# Patient Record
Sex: FEMALE | Race: WHITE | Employment: UNEMPLOYED | URBAN - METROPOLITAN AREA
[De-identification: names, ages, dates, MRNs, and addresses within clinical notes are randomized per-mention and may not be internally consistent; named-entity substitution may affect disease eponyms.]

---

## 2019-09-12 ENCOUNTER — HOSPITAL ENCOUNTER (EMERGENCY)
Age: 39
Discharge: HOME | End: 2019-09-12
Attending: EMERGENCY MEDICINE
Payer: COMMERCIAL

## 2019-09-12 VITALS
BODY MASS INDEX: 21.62 KG/M2 | DIASTOLIC BLOOD PRESSURE: 82 MMHG | HEART RATE: 101 BPM | HEIGHT: 63 IN | TEMPERATURE: 98.2 F | WEIGHT: 122 LBS | RESPIRATION RATE: 14 BRPM | SYSTOLIC BLOOD PRESSURE: 130 MMHG | OXYGEN SATURATION: 100 %

## 2019-09-12 DIAGNOSIS — N39.0 ACUTE UTI: Primary | ICD-10-CM

## 2019-09-12 LAB
A/G RATIO: 1.3 (ref 1.1–2.2)
ALBUMIN SERPL-MCNC: 4.2 G/DL (ref 3.4–5)
ALP BLD-CCNC: 71 U/L (ref 40–129)
ALT SERPL-CCNC: 14 U/L (ref 10–40)
ANION GAP SERPL CALCULATED.3IONS-SCNC: 10 MMOL/L (ref 3–16)
AST SERPL-CCNC: 20 U/L (ref 15–37)
BACTERIA: ABNORMAL /HPF
BASOPHILS ABSOLUTE: 0 K/UL (ref 0–0.2)
BASOPHILS RELATIVE PERCENT: 0.4 %
BILIRUB SERPL-MCNC: 0.7 MG/DL (ref 0–1)
BILIRUBIN URINE: NEGATIVE
BLOOD, URINE: NEGATIVE
BUN BLDV-MCNC: 13 MG/DL (ref 7–20)
CALCIUM SERPL-MCNC: 9.1 MG/DL (ref 8.3–10.6)
CHLORIDE BLD-SCNC: 100 MMOL/L (ref 99–110)
CLARITY: CLEAR
CO2: 27 MMOL/L (ref 21–32)
COLOR: YELLOW
CREAT SERPL-MCNC: 0.7 MG/DL (ref 0.6–1.1)
EOSINOPHILS ABSOLUTE: 0.1 K/UL (ref 0–0.6)
EOSINOPHILS RELATIVE PERCENT: 0.7 %
EPITHELIAL CELLS, UA: ABNORMAL /HPF
GFR AFRICAN AMERICAN: >60
GFR NON-AFRICAN AMERICAN: >60
GLOBULIN: 3.2 G/DL
GLUCOSE BLD-MCNC: 99 MG/DL (ref 70–99)
GLUCOSE URINE: NEGATIVE MG/DL
HCG(URINE) PREGNANCY TEST: NEGATIVE
HCT VFR BLD CALC: 39.1 % (ref 36–48)
HEMOGLOBIN: 13.7 G/DL (ref 12–16)
KETONES, URINE: NEGATIVE MG/DL
LEUKOCYTE ESTERASE, URINE: ABNORMAL
LYMPHOCYTES ABSOLUTE: 0.5 K/UL (ref 1–5.1)
LYMPHOCYTES RELATIVE PERCENT: 6.7 %
MCH RBC QN AUTO: 31 PG (ref 26–34)
MCHC RBC AUTO-ENTMCNC: 34.9 G/DL (ref 31–36)
MCV RBC AUTO: 88.8 FL (ref 80–100)
MICROSCOPIC EXAMINATION: YES
MONOCYTES ABSOLUTE: 0.2 K/UL (ref 0–1.3)
MONOCYTES RELATIVE PERCENT: 2.3 %
MUCUS: ABNORMAL /LPF
NEUTROPHILS ABSOLUTE: 7 K/UL (ref 1.7–7.7)
NEUTROPHILS RELATIVE PERCENT: 89.9 %
NITRITE, URINE: NEGATIVE
PDW BLD-RTO: 13.4 % (ref 12.4–15.4)
PH UA: 6 (ref 5–8)
PLATELET # BLD: 248 K/UL (ref 135–450)
PMV BLD AUTO: 7.3 FL (ref 5–10.5)
POTASSIUM REFLEX MAGNESIUM: 4 MMOL/L (ref 3.5–5.1)
PROTEIN UA: ABNORMAL MG/DL
RBC # BLD: 4.4 M/UL (ref 4–5.2)
RBC UA: ABNORMAL /HPF (ref 0–2)
SODIUM BLD-SCNC: 137 MMOL/L (ref 136–145)
SPECIFIC GRAVITY UA: >=1.03 (ref 1–1.03)
TOTAL PROTEIN: 7.4 G/DL (ref 6.4–8.2)
URINE REFLEX TO CULTURE: YES
URINE TYPE: ABNORMAL
UROBILINOGEN, URINE: 0.2 E.U./DL
WBC # BLD: 7.8 K/UL (ref 4–11)
WBC UA: ABNORMAL /HPF (ref 0–5)

## 2019-09-12 PROCEDURE — 85025 COMPLETE CBC W/AUTO DIFF WBC: CPT

## 2019-09-12 PROCEDURE — 80053 COMPREHEN METABOLIC PANEL: CPT

## 2019-09-12 PROCEDURE — 84703 CHORIONIC GONADOTROPIN ASSAY: CPT

## 2019-09-12 PROCEDURE — 87086 URINE CULTURE/COLONY COUNT: CPT

## 2019-09-12 PROCEDURE — 81001 URINALYSIS AUTO W/SCOPE: CPT

## 2019-09-12 PROCEDURE — 99284 EMERGENCY DEPT VISIT MOD MDM: CPT

## 2019-09-12 PROCEDURE — 6370000000 HC RX 637 (ALT 250 FOR IP): Performed by: EMERGENCY MEDICINE

## 2019-09-12 RX ORDER — NITROFURANTOIN 25; 75 MG/1; MG/1
100 CAPSULE ORAL 2 TIMES DAILY
Qty: 14 CAPSULE | Refills: 0 | Status: SHIPPED | OUTPATIENT
Start: 2019-09-12 | End: 2019-09-19

## 2019-09-12 RX ORDER — NITROFURANTOIN 25; 75 MG/1; MG/1
100 CAPSULE ORAL ONCE
Status: COMPLETED | OUTPATIENT
Start: 2019-09-12 | End: 2019-09-12

## 2019-09-12 RX ADMIN — NITROFURANTOIN (MONOHYDRATE/MACROCRYSTALS) 100 MG: 75; 25 CAPSULE ORAL at 03:10

## 2019-09-12 ASSESSMENT — PAIN DESCRIPTION - LOCATION: LOCATION: HEAD

## 2019-09-12 ASSESSMENT — PAIN SCALES - GENERAL: PAINLEVEL_OUTOF10: 7

## 2019-09-13 LAB — URINE CULTURE, ROUTINE: NORMAL

## 2019-09-19 NOTE — ED PROVIDER NOTES
2-12 intact, No gross facial drooping. Strength 5/5, sensation intact. Normal coordination. Gait normal.   PSYCHIATRIC: Normal mood and affect. LABS  I have reviewed all labs for this visit. Results for orders placed or performed during the hospital encounter of 09/12/19   Urine Culture   Result Value Ref Range    Urine Culture, Routine       <10,000 CFU/ml mixed skin/urogenital gale.  No further workup   Urinalysis Reflex to Culture   Result Value Ref Range    Color, UA Yellow Straw/Yellow    Clarity, UA Clear Clear    Glucose, Ur Negative Negative mg/dL    Bilirubin Urine Negative Negative    Ketones, Urine Negative Negative mg/dL    Specific Gravity, UA >=1.030 1.005 - 1.030    Blood, Urine Negative Negative    pH, UA 6.0 5.0 - 8.0    Protein, UA TRACE (A) Negative mg/dL    Urobilinogen, Urine 0.2 <2.0 E.U./dL    Nitrite, Urine Negative Negative    Leukocyte Esterase, Urine MODERATE (A) Negative    Microscopic Examination YES     Urine Reflex to Culture Yes     Urine Type Not Specified    CBC Auto Differential   Result Value Ref Range    WBC 7.8 4.0 - 11.0 K/uL    RBC 4.40 4.00 - 5.20 M/uL    Hemoglobin 13.7 12.0 - 16.0 g/dL    Hematocrit 39.1 36.0 - 48.0 %    MCV 88.8 80.0 - 100.0 fL    MCH 31.0 26.0 - 34.0 pg    MCHC 34.9 31.0 - 36.0 g/dL    RDW 13.4 12.4 - 15.4 %    Platelets 042 490 - 869 K/uL    MPV 7.3 5.0 - 10.5 fL    Neutrophils % 89.9 %    Lymphocytes % 6.7 %    Monocytes % 2.3 %    Eosinophils % 0.7 %    Basophils % 0.4 %    Neutrophils Absolute 7.0 1.7 - 7.7 K/uL    Lymphocytes Absolute 0.5 (L) 1.0 - 5.1 K/uL    Monocytes Absolute 0.2 0.0 - 1.3 K/uL    Eosinophils Absolute 0.1 0.0 - 0.6 K/uL    Basophils Absolute 0.0 0.0 - 0.2 K/uL   Comprehensive Metabolic Panel w/ Reflex to MG   Result Value Ref Range    Sodium 137 136 - 145 mmol/L    Potassium reflex Magnesium 4.0 3.5 - 5.1 mmol/L    Chloride 100 99 - 110 mmol/L    CO2 27 21 - 32 mmol/L    Anion Gap 10 3 - 16    Glucose 99 70 - 99 mg/dL    BUN please feel free to contact the dictating provider for clarification.      Charley Schwab,   ATTENDING, [address] [place] [address],   09/19/19 0283

## 2022-12-12 ENCOUNTER — HOSPITAL ENCOUNTER (EMERGENCY)
Age: 42
Discharge: HOME | End: 2022-12-13
Payer: COMMERCIAL

## 2022-12-12 DIAGNOSIS — R03.0 ELEVATED BLOOD PRESSURE READING: ICD-10-CM

## 2022-12-12 DIAGNOSIS — N30.00 ACUTE CYSTITIS WITHOUT HEMATURIA: Primary | ICD-10-CM

## 2022-12-12 LAB
BACTERIA WET PREP: NORMAL
BACTERIA: ABNORMAL /HPF
BILIRUBIN URINE: NEGATIVE
BLOOD, URINE: NEGATIVE
CLARITY: ABNORMAL
CLUE CELLS: NORMAL
COLOR: YELLOW
EPITHELIAL CELLS WET PREP: NORMAL
EPITHELIAL CELLS, UA: ABNORMAL /HPF (ref 0–5)
GLUCOSE URINE: NEGATIVE MG/DL
KETONES, URINE: NEGATIVE MG/DL
LEUKOCYTE ESTERASE, URINE: NEGATIVE
MICROSCOPIC EXAMINATION: YES
NITRITE, URINE: POSITIVE
PH UA: 6.5 (ref 5–8)
PROTEIN UA: ABNORMAL MG/DL
RBC UA: ABNORMAL /HPF (ref 0–4)
RBC WET PREP: NORMAL
SOURCE WET PREP: NORMAL
SPECIFIC GRAVITY UA: 1.02 (ref 1–1.03)
TRICHOMONAS PREP: NORMAL
URINE REFLEX TO CULTURE: YES
URINE TYPE: ABNORMAL
UROBILINOGEN, URINE: 0.2 E.U./DL
WBC UA: ABNORMAL /HPF (ref 0–5)
WBC WET PREP: NORMAL
YEAST WET PREP: NORMAL

## 2022-12-12 PROCEDURE — 87086 URINE CULTURE/COLONY COUNT: CPT

## 2022-12-12 PROCEDURE — 81001 URINALYSIS AUTO W/SCOPE: CPT

## 2022-12-12 PROCEDURE — 87210 SMEAR WET MOUNT SALINE/INK: CPT

## 2022-12-12 PROCEDURE — 87491 CHLMYD TRACH DNA AMP PROBE: CPT

## 2022-12-12 PROCEDURE — 87077 CULTURE AEROBIC IDENTIFY: CPT

## 2022-12-12 PROCEDURE — 99284 EMERGENCY DEPT VISIT MOD MDM: CPT

## 2022-12-12 PROCEDURE — 84703 CHORIONIC GONADOTROPIN ASSAY: CPT

## 2022-12-12 PROCEDURE — 87186 SC STD MICRODIL/AGAR DIL: CPT

## 2022-12-12 PROCEDURE — 87591 N.GONORRHOEAE DNA AMP PROB: CPT

## 2022-12-12 RX ORDER — CEFUROXIME AXETIL 250 MG/1
500 TABLET ORAL ONCE
Status: COMPLETED | OUTPATIENT
Start: 2022-12-13 | End: 2022-12-13

## 2022-12-12 RX ORDER — CEFUROXIME AXETIL 250 MG/1
250 TABLET ORAL 2 TIMES DAILY
Qty: 14 TABLET | Refills: 0 | Status: SHIPPED | OUTPATIENT
Start: 2022-12-12 | End: 2022-12-19

## 2022-12-12 ASSESSMENT — PAIN DESCRIPTION - ORIENTATION: ORIENTATION: LOWER

## 2022-12-12 ASSESSMENT — PAIN DESCRIPTION - DESCRIPTORS: DESCRIPTORS: CRAMPING;PRESSURE

## 2022-12-12 ASSESSMENT — PAIN - FUNCTIONAL ASSESSMENT
PAIN_FUNCTIONAL_ASSESSMENT: 0-10
PAIN_FUNCTIONAL_ASSESSMENT: ACTIVITIES ARE NOT PREVENTED

## 2022-12-12 ASSESSMENT — PAIN DESCRIPTION - LOCATION: LOCATION: ABDOMEN

## 2022-12-12 ASSESSMENT — LIFESTYLE VARIABLES
HOW OFTEN DO YOU HAVE A DRINK CONTAINING ALCOHOL: NEVER
HOW MANY STANDARD DRINKS CONTAINING ALCOHOL DO YOU HAVE ON A TYPICAL DAY: PATIENT DOES NOT DRINK

## 2022-12-12 ASSESSMENT — PAIN SCALES - GENERAL: PAINLEVEL_OUTOF10: 4

## 2022-12-12 ASSESSMENT — PAIN DESCRIPTION - FREQUENCY: FREQUENCY: INTERMITTENT

## 2022-12-12 ASSESSMENT — PAIN DESCRIPTION - PAIN TYPE: TYPE: ACUTE PAIN

## 2022-12-13 VITALS
WEIGHT: 140 LBS | SYSTOLIC BLOOD PRESSURE: 145 MMHG | HEART RATE: 75 BPM | BODY MASS INDEX: 24.8 KG/M2 | RESPIRATION RATE: 18 BRPM | TEMPERATURE: 98.8 F | HEIGHT: 63 IN | OXYGEN SATURATION: 100 % | DIASTOLIC BLOOD PRESSURE: 105 MMHG

## 2022-12-13 LAB
C TRACH DNA GENITAL QL NAA+PROBE: NEGATIVE
HCG(URINE) PREGNANCY TEST: NEGATIVE
N. GONORRHOEAE DNA: NEGATIVE

## 2022-12-13 PROCEDURE — 6370000000 HC RX 637 (ALT 250 FOR IP): Performed by: PHYSICIAN ASSISTANT

## 2022-12-13 RX ADMIN — CEFUROXIME AXETIL 500 MG: 250 TABLET ORAL at 00:02

## 2022-12-13 ASSESSMENT — ENCOUNTER SYMPTOMS
COUGH: 0
SHORTNESS OF BREATH: 0
NAUSEA: 1
DIARRHEA: 0
CHEST TIGHTNESS: 0
BACK PAIN: 0
ABDOMINAL PAIN: 1
VOMITING: 0
SORE THROAT: 0

## 2022-12-13 NOTE — ED PROVIDER NOTES
**ADVANCED PRACTICE PROVIDER, I HAVE EVALUATED THIS [facility]  ED  EMERGENCY DEPARTMENT ENCOUNTER      Pt Name: Jalil Holt  JMS:[id number]  [place] 1980  Date of evaluation: 12/12/2022  Provider: AVI Hand  Note Started: 11:13 PM EST 12/13/2022        Chief Complaint:    Chief Complaint   Patient presents with    Abdominal Pain     C/o lower abd pain x3 days. Reports it feels like her bladder is full. Endorses cloudy urine, states she gets frequent UTIs. Pt also states \"I figured I should get checked for STDs while im here. \" Denies known exposure. Denies vaginal discharge/odor. Nursing Notes, Past Medical Hx, Past Surgical Hx, Social Hx, Allergies, and Family Hx were all reviewed and agreed with or any disagreements were addressed in the HPI.    HPI: (Location, Duration, Timing, Severity, Quality, Assoc Sx, Context, Modifying factors)    Chief Complaint of abdominal pain and dysuria. This is a  38 y.o. female who presents via private vehicle complaining of abdominal pain and dysuria. The patient states that she feels like her bladder is full and she has noticed cloudy, smelly urine. She states she is prone to UTIs. No recent antibiotics. She also is requesting to be tested for STDs while she is here. She denies any vaginal discharge or bleeding. She has not had any recent exposure to STD. Currently she rates pain as 4/10. She has had associated nausea without vomiting.     PastMedical/Surgical History:      Diagnosis Date    Addiction to drug [facility])     ADHD (attention deficit hyperactivity disorder)     Anxiety     Depression          Procedure Laterality Date    TYMPANOSTOMY TUBE PLACEMENT         Medications:  Discharge Medication List as of 12/13/2022 12:00 AM        CONTINUE these medications which have NOT CHANGED    Details   ibuprofen (ADVIL;MOTRIN) 800 MG tablet Take 1 tablet by mouth every 8 hours as needed for Pain or Fever, Disp-20 tablet, R-0      naproxen (NAPROSYN) 500 MG tablet Take 1 tablet by mouth 2 times daily (with meals) for 30 doses. , Disp-30 tablet, R-0               Review of Systems:  (2-9 systems needed)  Review of Systems   Constitutional:  Negative for chills and fever. HENT:  Negative for congestion, nosebleeds and sore throat. Eyes:  Negative for visual disturbance. Respiratory:  Negative for cough, chest tightness and shortness of breath. Cardiovascular:  Negative for chest pain and palpitations. Gastrointestinal:  Positive for abdominal pain and nausea. Negative for diarrhea and vomiting. Genitourinary:  Positive for dysuria. Negative for frequency, hematuria and urgency. Musculoskeletal:  Negative for back pain and neck pain. Skin:  Negative for rash. Neurological:  Negative for dizziness, weakness, light-headedness and headaches. \"Positives and Pertinent negatives as per HPI\"    Physical Exam:  Physical Exam  Vitals and nursing note reviewed. Constitutional:       Appearance: Normal appearance. She is not diaphoretic. HENT:      Head: Normocephalic and atraumatic. Nose: Nose normal.      Mouth/Throat:      Mouth: Mucous membranes are moist.   Eyes:      General:         Right eye: No discharge. Left eye: No discharge. Extraocular Movements: Extraocular movements intact. Pupils: Pupils are equal, round, and reactive to light. Pulmonary:      Effort: Pulmonary effort is normal. No respiratory distress. Abdominal:      General: Abdomen is flat. Palpations: Abdomen is soft. Tenderness: There is abdominal tenderness in the suprapubic area. There is no right CVA tenderness, left CVA tenderness, guarding or rebound. Musculoskeletal:         General: Normal range of motion. Cervical back: Normal range of motion and neck supple. Skin:     General: Skin is warm and dry. Coloration: Skin is not pale.    Neurological:      Mental Status: She is alert and oriented to person, place, and time. Psychiatric:         Mood and Affect: Mood normal.         Behavior: Behavior normal.       MEDICAL DECISION MAKING    Vitals:    Vitals:    12/12/22 2218 12/13/22 0000   BP: (!) 162/106 (!) 145/105   Pulse: 94 75   Resp: 18    Temp: 98.8 °F (37.1 °C)    TempSrc: Oral    SpO2: 100% 100%   Weight: 140 lb (63.5 kg)    Height: 5' 3\" (1.6 m)        LABS:  Labs Reviewed   URINALYSIS WITH REFLEX TO CULTURE - Abnormal; Notable for the following components:       Result Value    Clarity, UA CLOUDY (*)     Protein, UA TRACE (*)     Nitrite, Urine POSITIVE (*)     All other components within normal limits   MICROSCOPIC URINALYSIS - Abnormal; Notable for the following components:    WBC, UA 10-20 (*)     Bacteria, UA 4+ (*)     All other components within normal limits   WET PREP, GENITAL   C.TRACHOMATIS N.GONORRHOEAE DNA   CULTURE, URINE        Remainder of labs reviewed and were negative at this time or not returned at the time of this note. RADIOLOGY:   Non-plain film images such as CT, Ultrasound and MRI are read by the radiologist. AVI Patel have directly visualized the radiologic plain film image(s) with the below findings:      Interpretation per the Radiologist below, if available at the time of this note:    No orders to display        No results found. MEDICAL DECISION MAKING / ED COURSE:      Is this patient to be included in the SEP-1 Core Measure due to severe sepsis or septic shock? No   Exclusion criteria - the patient is NOT to be included for SEP-1 Core Measure due to:  2+ SIRS criteria are not met      PROCEDURES:   Procedures    None    Patient was given:  Medications   cefUROXime (CEFTIN) tablet 500 mg (500 mg Oral Given 12/13/22 0002)       Patient was evaluated in the emergency department today for abdominal pain and dysuria.   Vital signs are notable for 145/105, the patient states she does not follow up with a primary care physician and will be referred to primary care physician for further evaluation and treatment. Physical exam is unremarkable. The patient is overall well-appearing. The patient will self swab for STD. Work-up results include:  Urinalysis with cloudy urine and nitrite positive. Wet prep unremarkable. Gonorrhoeae and chlamydia pending. He discussion was had with the patient regarding lab results. She is given initial dose of antibiotic in the emergency department remainder sent over to pharmacy. She will follow-up with primary care referral for further evaluation and treatment. Return precautions are given. All questions are answered and she is discharged home in good condition. The patient tolerated their visit well. I evaluated the patient. The physician was available for consultation as needed. The patient and / or the family were informed of the results of any tests, a time was given to answer questions, a plan was proposed and they agreed with plan. I am the Primary Clinician of Record. CLINICAL IMPRESSION:  1.  Acute cystitis without hematuria      Results for orders placed or performed during the hospital encounter of 12/12/22   Wet prep, genital    Specimen: Vaginal   Result Value Ref Range    Trichomonas Prep None Seen     Yeast, Wet Prep None Seen     Clue Cells, Wet Prep None Seen     WBC, Wet Prep <1+     RBC, Wet Prep <1+     Epi Cells 4+     Bacteria <1+     Source Wet Prep Vaginal    C.trachomatis N.gonorrhoeae DNA    Specimen: Vaginal   Result Value Ref Range    C. trachomatis DNA Negative Negative    N. gonorrhoeae DNA Negative Negative   Culture, Urine    Specimen: Urine, clean catch   Result Value Ref Range    Organism Klebsiella pneumoniae (A)     Urine Culture, Routine >100,000 CFU/ml     Organism Escherichia coli (A)     Urine Culture, Routine >100,000 CFU/ml        Susceptibility    Escherichia coli - BACTERIAL SUSCEPTIBILITY PANEL BY YAMIL     ampicillin <=8 Sensitive mcg/mL ampicillin-sulbactam <=8/4 Sensitive mcg/mL     ceFAZolin* <=2 Sensitive mcg/mL      * NOTE: Cefazolin should only be used for uncomplicated UTI        for E.coli or Klebsiella pneumoniae. NOTE: Cefazolin should only be used for uncomplicated UTI        for E.coli or Klebsiella pneumoniae. cefepime <=2 Sensitive mcg/mL     cefTRIAXone <=1 Sensitive mcg/mL     cefuroxime <=4 Sensitive mcg/mL     ciprofloxacin <=1 Sensitive mcg/mL     ertapenem <=0.5 Sensitive mcg/mL     gentamicin <=4 Sensitive mcg/mL     meropenem <=1 Sensitive mcg/mL     nitrofurantoin <=32 Sensitive mcg/mL     piperacillin-tazobactam <=16 Sensitive mcg/mL     trimethoprim-sulfamethoxazole <=2/38 Sensitive mcg/mL    Klebsiella pneumoniae - BACTERIAL SUSCEPTIBILITY PANEL BY YAMIL     amoxicillin-clavulanate <=8/4 Sensitive mcg/mL     ampicillin >16 Resistant mcg/mL     ampicillin-sulbactam <=8/4 Sensitive mcg/mL     ceFAZolin* <=2 Sensitive mcg/mL      * NOTE: Cefazolin should only be used for uncomplicated UTI        for E.coli or Klebsiella pneumoniae.        cefepime <=2 Sensitive mcg/mL     cefTRIAXone <=1 Sensitive mcg/mL     cefuroxime <=4 Sensitive mcg/mL     ciprofloxacin <=1 Sensitive mcg/mL     ertapenem <=0.5 Sensitive mcg/mL     gentamicin <=4 Sensitive mcg/mL     meropenem <=1 Sensitive mcg/mL     nitrofurantoin 64 Intermediate mcg/mL     piperacillin-tazobactam <=16 Sensitive mcg/mL     trimethoprim-sulfamethoxazole <=2/38 Sensitive mcg/mL   Urinalysis with Reflex to Culture    Specimen: Urine   Result Value Ref Range    Color, UA Yellow Straw/Yellow    Clarity, UA CLOUDY (A) Clear    Glucose, Ur Negative Negative mg/dL    Bilirubin Urine Negative Negative    Ketones, Urine Negative Negative mg/dL    Specific Gravity, UA 1.025 1.005 - 1.030    Blood, Urine Negative Negative    pH, UA 6.5 5.0 - 8.0    Protein, UA TRACE (A) Negative mg/dL    Urobilinogen, Urine 0.2 <2.0 E.U./dL    Nitrite, Urine POSITIVE (A) Negative    Leukocyte Esterase, Urine Negative Negative    Microscopic Examination YES     Urine Type NotGiven     Urine Reflex to Culture Yes    Microscopic Urinalysis   Result Value Ref Range    WBC, UA 10-20 (A) 0 - 5 /HPF    RBC, UA None seen 0 - 4 /HPF    Epithelial Cells, UA 2-5 0 - 5 /HPF    Bacteria, UA 4+ (A) None Seen /HPF   Urine Preg (Lab)   Result Value Ref Range    HCG(Urine) Pregnancy Test Negative Detects HCG level >20 MIU/mL         I estimate there is LOW risk for ACUTE APPENDICITIS, BOWEL OBSTRUCTION, CHOLECYSTITIS, DIVERTICULITIS, INCARCERATED HERNIA, PANCREATITIS, or PERFORATED BOWEL or ULCER, thus I consider the discharge disposition reasonable. Also, there is no evidence or peritonitis, sepsis, or toxicity. Denisa Schneider and I have discussed the diagnosis and risks, and we agree with discharging home to follow-up with their primary doctor. We also discussed returning to the Emergency Department immediately if new or worsening symptoms occur. We have discussed the symptoms which are most concerning (e.g., bloody stool, fever, changing or worsening pain, vomiting) that necessitate immediate return. FINAL Impression    1. Acute cystitis without hematuria    2. Elevated blood pressure reading        Blood pressure (!) 145/105, pulse 75, temperature 98.8 °F (37.1 °C), temperature source Oral, resp. rate 18, height 5' 3\" (1.6 m), weight 140 lb (63.5 kg), SpO2 100 %, not currently breastfeeding.       DISPOSITION Decision To Discharge 12/12/2022 11:46:25 PM      PATIENT REFERRED TO:  [facility]  ED  [address] [facility] [address]  Go to   If symptoms worsen    [address] [facility]  Schedule an appointment as soon as possible for a visit       DISCHARGE MEDICATIONS:  Discharge Medication List as of 12/13/2022 12:00 AM        START taking these medications    Details   cefUROXime (CEFTIN) 250 MG tablet Take 1 tablet by mouth 2 times daily for 7 days, Disp-14 tablet, R-0Normal             DISCONTINUED MEDICATIONS:  Discharge Medication List as of 12/13/2022 12:00 AM                 (Please note the MDM and HPI sections of this note were completed with a voice recognition program.  Efforts were made to edit the dictations but occasionally words are mis-transcribed.)    Electronically signed, Sayda Doyle,          Sayda Doyle  12/17/22 5988

## 2022-12-15 LAB
ORGANISM: ABNORMAL
ORGANISM: ABNORMAL
URINE CULTURE, ROUTINE: ABNORMAL
URINE CULTURE, ROUTINE: ABNORMAL

## 2022-12-22 ENCOUNTER — OFFICE VISIT (OUTPATIENT)
Dept: INTERNAL MEDICINE CLINIC | Age: 42
End: 2022-12-22

## 2022-12-22 ENCOUNTER — HOSPITAL ENCOUNTER (OUTPATIENT)
Age: 42
Discharge: HOME | End: 2022-12-22
Payer: COMMERCIAL

## 2022-12-22 VITALS
HEIGHT: 65 IN | HEART RATE: 88 BPM | WEIGHT: 138 LBS | SYSTOLIC BLOOD PRESSURE: 128 MMHG | BODY MASS INDEX: 22.99 KG/M2 | OXYGEN SATURATION: 99 % | DIASTOLIC BLOOD PRESSURE: 84 MMHG | TEMPERATURE: 97.3 F

## 2022-12-22 DIAGNOSIS — Z87.59 HISTORY OF PRE-ECLAMPSIA: ICD-10-CM

## 2022-12-22 DIAGNOSIS — F32.0 CURRENT MILD EPISODE OF MAJOR DEPRESSIVE DISORDER, UNSPECIFIED WHETHER RECURRENT (HCC): ICD-10-CM

## 2022-12-22 DIAGNOSIS — Z12.4 CERVICAL CANCER SCREENING: ICD-10-CM

## 2022-12-22 DIAGNOSIS — F19.91 HISTORY OF INTRAVENOUS DRUG USE IN REMISSION: ICD-10-CM

## 2022-12-22 DIAGNOSIS — Z76.89 ENCOUNTER TO ESTABLISH CARE: Primary | ICD-10-CM

## 2022-12-22 DIAGNOSIS — Z76.89 ENCOUNTER TO ESTABLISH CARE: ICD-10-CM

## 2022-12-22 DIAGNOSIS — F17.210 SMOKING 1/2 PACK A DAY OR LESS: ICD-10-CM

## 2022-12-22 DIAGNOSIS — N39.0 RECURRENT UTI (URINARY TRACT INFECTION): ICD-10-CM

## 2022-12-22 DIAGNOSIS — F90.9 ATTENTION DEFICIT HYPERACTIVITY DISORDER (ADHD), UNSPECIFIED ADHD TYPE: ICD-10-CM

## 2022-12-22 DIAGNOSIS — F41.1 GAD (GENERALIZED ANXIETY DISORDER): ICD-10-CM

## 2022-12-22 LAB
A/G RATIO: 1.3 (ref 1.1–2.2)
ALBUMIN SERPL-MCNC: 4.2 G/DL (ref 3.4–5)
ALP BLD-CCNC: 71 U/L (ref 40–129)
ALT SERPL-CCNC: 13 U/L (ref 10–40)
ANION GAP SERPL CALCULATED.3IONS-SCNC: 15 MMOL/L (ref 3–16)
AST SERPL-CCNC: 18 U/L (ref 15–37)
BASOPHILS ABSOLUTE: 0.1 K/UL (ref 0–0.2)
BASOPHILS RELATIVE PERCENT: 0.8 %
BILIRUB SERPL-MCNC: 0.6 MG/DL (ref 0–1)
BUN BLDV-MCNC: 15 MG/DL (ref 7–20)
CALCIUM SERPL-MCNC: 9.4 MG/DL (ref 8.3–10.6)
CHLORIDE BLD-SCNC: 98 MMOL/L (ref 99–110)
CHOLESTEROL, TOTAL: 172 MG/DL (ref 0–199)
CO2: 20 MMOL/L (ref 21–32)
CREAT SERPL-MCNC: 0.6 MG/DL (ref 0.6–1.1)
EOSINOPHILS ABSOLUTE: 0.3 K/UL (ref 0–0.6)
EOSINOPHILS RELATIVE PERCENT: 2.7 %
GFR SERPL CREATININE-BSD FRML MDRD: >60 ML/MIN/{1.73_M2}
GLUCOSE BLD-MCNC: 79 MG/DL (ref 70–99)
HCT VFR BLD CALC: 41.8 % (ref 36–48)
HDLC SERPL-MCNC: 56 MG/DL (ref 40–60)
HEMOGLOBIN: 14.2 G/DL (ref 12–16)
LDL CHOLESTEROL CALCULATED: 94 MG/DL
LYMPHOCYTES ABSOLUTE: 2.3 K/UL (ref 1–5.1)
LYMPHOCYTES RELATIVE PERCENT: 22.6 %
MCH RBC QN AUTO: 29.9 PG (ref 26–34)
MCHC RBC AUTO-ENTMCNC: 34 G/DL (ref 31–36)
MCV RBC AUTO: 87.9 FL (ref 80–100)
MONOCYTES ABSOLUTE: 0.5 K/UL (ref 0–1.3)
MONOCYTES RELATIVE PERCENT: 5.2 %
NEUTROPHILS ABSOLUTE: 7.1 K/UL (ref 1.7–7.7)
NEUTROPHILS RELATIVE PERCENT: 68.7 %
PDW BLD-RTO: 13 % (ref 12.4–15.4)
PLATELET # BLD: 264 K/UL (ref 135–450)
PMV BLD AUTO: 9.5 FL (ref 5–10.5)
POTASSIUM SERPL-SCNC: 4 MMOL/L (ref 3.5–5.1)
RBC # BLD: 4.76 M/UL (ref 4–5.2)
SODIUM BLD-SCNC: 133 MMOL/L (ref 136–145)
TOTAL PROTEIN: 7.4 G/DL (ref 6.4–8.2)
TRIGL SERPL-MCNC: 111 MG/DL (ref 0–150)
TSH REFLEX FT4: 1.96 UIU/ML (ref 0.27–4.2)
VLDLC SERPL CALC-MCNC: 22 MG/DL
WBC # BLD: 10.4 K/UL (ref 4–11)

## 2022-12-22 PROCEDURE — 80053 COMPREHEN METABOLIC PANEL: CPT

## 2022-12-22 PROCEDURE — 87390 HIV-1 AG IA: CPT

## 2022-12-22 PROCEDURE — 36415 COLL VENOUS BLD VENIPUNCTURE: CPT

## 2022-12-22 PROCEDURE — 86803 HEPATITIS C AB TEST: CPT

## 2022-12-22 PROCEDURE — 86702 HIV-2 ANTIBODY: CPT

## 2022-12-22 PROCEDURE — 86701 HIV-1ANTIBODY: CPT

## 2022-12-22 PROCEDURE — 85025 COMPLETE CBC W/AUTO DIFF WBC: CPT

## 2022-12-22 PROCEDURE — 84443 ASSAY THYROID STIM HORMONE: CPT

## 2022-12-22 PROCEDURE — 80061 LIPID PANEL: CPT

## 2022-12-22 RX ORDER — BUPROPION HYDROCHLORIDE 150 MG/1
150 TABLET ORAL EVERY MORNING
Qty: 30 TABLET | Refills: 3 | Status: SHIPPED | OUTPATIENT
Start: 2022-12-22

## 2022-12-22 SDOH — ECONOMIC STABILITY: FOOD INSECURITY: WITHIN THE PAST 12 MONTHS, THE FOOD YOU BOUGHT JUST DIDN'T LAST AND YOU DIDN'T HAVE MONEY TO GET MORE.: NEVER TRUE

## 2022-12-22 SDOH — ECONOMIC STABILITY: FOOD INSECURITY: WITHIN THE PAST 12 MONTHS, YOU WORRIED THAT YOUR FOOD WOULD RUN OUT BEFORE YOU GOT MONEY TO BUY MORE.: NEVER TRUE

## 2022-12-22 ASSESSMENT — PATIENT HEALTH QUESTIONNAIRE - PHQ9
4. FEELING TIRED OR HAVING LITTLE ENERGY: 3
SUM OF ALL RESPONSES TO PHQ QUESTIONS 1-9: 17
6. FEELING BAD ABOUT YOURSELF - OR THAT YOU ARE A FAILURE OR HAVE LET YOURSELF OR YOUR FAMILY DOWN: 1
3. TROUBLE FALLING OR STAYING ASLEEP: 1
2. FEELING DOWN, DEPRESSED OR HOPELESS: 2
SUM OF ALL RESPONSES TO PHQ9 QUESTIONS 1 & 2: 5
9. THOUGHTS THAT YOU WOULD BE BETTER OFF DEAD, OR OF HURTING YOURSELF: 0
8. MOVING OR SPEAKING SO SLOWLY THAT OTHER PEOPLE COULD HAVE NOTICED. OR THE OPPOSITE, BEING SO FIGETY OR RESTLESS THAT YOU HAVE BEEN MOVING AROUND A LOT MORE THAN USUAL: 3
7. TROUBLE CONCENTRATING ON THINGS, SUCH AS READING THE NEWSPAPER OR WATCHING TELEVISION: 3
10. IF YOU CHECKED OFF ANY PROBLEMS, HOW DIFFICULT HAVE THESE PROBLEMS MADE IT FOR YOU TO DO YOUR WORK, TAKE CARE OF THINGS AT HOME, OR GET ALONG WITH OTHER PEOPLE: 0
SUM OF ALL RESPONSES TO PHQ QUESTIONS 1-9: 17
5. POOR APPETITE OR OVEREATING: 1
SUM OF ALL RESPONSES TO PHQ QUESTIONS 1-9: 17
1. LITTLE INTEREST OR PLEASURE IN DOING THINGS: 3
SUM OF ALL RESPONSES TO PHQ QUESTIONS 1-9: 17

## 2022-12-22 ASSESSMENT — SOCIAL DETERMINANTS OF HEALTH (SDOH): HOW HARD IS IT FOR YOU TO PAY FOR THE VERY BASICS LIKE FOOD, HOUSING, MEDICAL CARE, AND HEATING?: NOT HARD AT ALL

## 2022-12-22 NOTE — PROGRESS NOTES
[facility] Internal Medicine  Establish care visit   2022    Zion Jain (:  1980) is a 38 y.o. female, here to establish care. Chief Complaint   Patient presents with    Established New Doctor    Urinary Tract Infection    Blood Pressure Check        ASSESSMENT/ PLAN  1. Attention deficit hyperactivity disorder (ADHD), unspecified ADHD type  2. BRITTANY (generalized anxiety disorder)  3. Current mild episode of major depressive disorder, unspecified whether recurrent ([address]  -Has not been on any medications. Denies SI or HI. Will start on Wellbutrin. Follow-up with Sophia . - buPROPion (WELLBUTRIN XL) 150 MG extended release tablet; Take 1 tablet by mouth every morning  Dispense: 30 tablet; Refill: 3    4. History of pre-eclampsia  -Positive history of preeclampsia. Monitor blood pressure closely. Blood pressure at goal today. 5. History of intravenous drug use in remission  -Last drug use 3 years ago. Screen for HIV and hep C  - HIV Screen; Future  - Hepatitis C Antibody; Future    6. Encounter to establish care  - TSH with Reflex to FT4; Future  - Lipid Panel; Future  - Comprehensive Metabolic Panel; Future  - CBC with Auto Differential; Future    7. Recurrent UTI (urinary tract infection)  -Patient noted to have at least 6 episodes of urinary tract infection. Refer to urology. Currently on antibiotics. Denies any fevers or back pain  - JOSSELINE - [facility]    8. Cervical cancer screening  - JOSSELINE Victor MD, Gynecology, [facility]    9. Smoking 1/2 pack a day or less  -Smokes less than half a pack now. Has smoked for over 20 years. Counseled on smoking cessation. Wants to try nicotine patches. Prescription sent    Return in about 3 months (around 3/22/2023) for Anxiety,BRITTANY. HPI  Patient is a 51-year-old female with past medical history significant for ADHD, depression, GERD, drug use presenting to establish care.   He endorses depressed mood but notes she is taking care of herself at this time. Was on Adderall long-term as well in the past.  Denies any SI or HI. Recently seen in the emergency room for acute cystitis and was treated with antibiotics which she is taking. Denies any new symptoms today. She noted to have at least 6 episodes of urinary tract infection this year. She was a past IV drug user and injected heroin. Last use 3 years ago. Continues to smoke one half a pack every day and has been drinking for last 20 years. Denies hemoptysis. ROS:      CONSTITUTIONAL:  No fevers, chills, sweats or weight changes  EYES:  No redness or visual symptoms. EARS, NOSE AND THROAT:  No difficulties with hearing. No symptoms of rhinitis or sore throat. CARDIOVASCULAR:  No chest pains, palpitations, orthopnea or paroxysmal noctunal dyspnea. RESPIRATORY:  No dyspnea on exertion, wheezing or cough. GI:  No nausea, vomiting, diarrhea, constipation, abdominal pain, hematochezia or melena. :  No dysuria, urinary hesitancy or dribbling. No nocturia or urinary frequency. No abnormal urethral  discharge. MUSCULOSKELETAL:  No muscle, joint or back aches  NEUROLOGIC:  No chronic headaches, no seizures. No numbness, tingling or weakness. PSYCHIATRIC: Endorses problems with anxiety and depressed mood. Denies SI or HI  ENDOCRINE:  No excessive urination or excessive thirst.  DERMATOLOGIC:  No rashes or skin changes. HISTORIES  Current Outpatient Medications on File Prior to Visit   Medication Sig Dispense Refill    ibuprofen (ADVIL;MOTRIN) 800 MG tablet Take 1 tablet by mouth every 8 hours as needed for Pain or Fever 20 tablet 0    naproxen (NAPROSYN) 500 MG tablet Take 1 tablet by mouth 2 times daily (with meals) for 30 doses. 30 tablet 0     No current facility-administered medications on file prior to visit.         No Known Allergies    Past Medical History:   Diagnosis Date    Addiction to drug [facility])     ADHD (attention deficit hyperactivity disorder)     Anxiety     Depression        There is no problem list on file for this patient. Past Surgical History:   Procedure Laterality Date    TYMPANOSTOMY TUBE PLACEMENT         Social History     Socioeconomic History    Marital status:      Spouse name: Not on file    Number of children: Not on file    Years of education: Not on file    Highest education level: Not on file   Occupational History    Not on file   Tobacco Use    Smoking status: Every Day     Packs/day: 0.50     Types: Cigarettes    Smokeless tobacco: Never   Substance and Sexual Activity    Alcohol use: No    Drug use: No    Sexual activity: Not on file   Other Topics Concern    Not on file   Social History Narrative    ** Merged History Encounter **          Social Determinants of Health     Financial Resource Strain: Low Risk     Difficulty of Paying Living Expenses: Not hard at all   Food Insecurity: No Food Insecurity    Worried About Running Out of Food in the Last Year: Never true    Ran Out of Food in the Last Year: Never true   Transportation Needs: Not on file   Physical Activity: Not on file   Stress: Not on file   Social Connections: Not on file   Intimate Partner Violence: Not on file   Housing Stability: Not on file        History reviewed. No pertinent family history. Physical Exam:    GENERAL APPEARANCE:  The patient is pleasant and well-appearing, in no acute distress A&Ox3, normal habitus  HEENT:  Normocephalic and atraumatic. No scleral Icterus. Pupils are equal and round and reactive to light and accomodation. No conjunctival injection is noted. Oropharynx is clear. Mouth revealed is good dentition, no lesions. Tympanic membranes are intact. No lymphadenopathy. LUNGS:  Equal air entry and clear of auscultation bilaterally. There are not crackles, wheezes or rhonchi noted. HEART:  Regular rate and rhythm, S1/S2. No murmurs are noted.   There are no lifts, heaves, or thrills noted on palpation. ABDOMEN:  Soft, non tender, non distended and no organomegaly. There are good bowel sounds. There is no rebounding or guarding. There is no evidence of hernia. SKIN:  There are no rashes, lesions, or ulcers noted. Warm and dry with good turgor. MUSCULOSKELETAL:  Gait is coordinated and smooth. There is no clubbing, cyanosis or edema. B/I pedal pulses are palpable. NEUROLOGIC:  Cranial nerves II through XII are grossly intact. Sensation to light touch and pain is intact and bilaterally. Vitals:    12/22/22 1325   BP: 128/84   Site: Right Upper Arm   Position: Sitting   Cuff Size: Medium Adult   Pulse: 88   Temp: 97.3 °F (36.3 °C)   TempSrc: Temporal   SpO2: 99%   Weight: 138 lb (62.6 kg)   Height: 5' 5\" (1.651 m)     Estimated body mass index is 22.96 kg/m² as calculated from the following:    Height as of this encounter: 5' 5\" (1.651 m). Weight as of this encounter: 138 lb (62.6 kg). Immunization History   Administered Date(s) Administered    Influenza Virus Vaccine 09/22/2020, 09/22/2020    MMR 05/02/1984    Tdap (Boostrix, Adacel) 11/07/2011, 12/08/2020       Health Maintenance   Topic Date Due    COVID-19 Vaccine (1) Never done    Varicella vaccine (1 of 2 - 2-dose childhood series) Never done    Pneumococcal 0-64 years Vaccine (1 - PCV) Never done    HIV screen  Never done    Cervical cancer screen  Never done    Flu vaccine (1) 08/01/2022    Depression Monitoring  12/22/2023    Lipids  12/22/2027    DTaP/Tdap/Td vaccine (3 - Td or Tdap) 12/08/2030    Hepatitis C screen  Completed    Hepatitis A vaccine  Aged Out    Hib vaccine  Aged Out    Meningococcal (ACWY) vaccine  Aged Out       PSH, PMH, SH and FH reviewed and noted. Recent and past labs, tests and consults also reviewed. Recent or new meds also reviewed. Sav Cardenas MD    This dictation was generated by voice recognition computer software.   Although all attempts are made to edit the dictation for accuracy, there may be errors in the transcription that are not intended.

## 2022-12-23 LAB
HEPATITIS C ANTIBODY INTERPRETATION: REACTIVE
HIV AG/AB: NORMAL
HIV ANTIGEN: NORMAL
HIV-1 ANTIBODY: NORMAL
HIV-2 AB: NORMAL

## 2022-12-23 RX ORDER — NICOTINE 21 MG/24HR
1 PATCH, TRANSDERMAL 24 HOURS TRANSDERMAL DAILY
Qty: 42 PATCH | Refills: 3 | Status: SHIPPED | OUTPATIENT
Start: 2022-12-23 | End: 2023-06-09

## 2022-12-28 DIAGNOSIS — R76.8 POSITIVE HEPATITIS C ANTIBODY TEST: Primary | ICD-10-CM

## 2022-12-30 ENCOUNTER — TELEPHONE (OUTPATIENT)
Dept: INTERNAL MEDICINE CLINIC | Age: 42
End: 2022-12-30

## 2022-12-30 DIAGNOSIS — R30.0 DYSURIA: Primary | ICD-10-CM

## 2022-12-30 RX ORDER — SULFAMETHOXAZOLE AND TRIMETHOPRIM 800; 160 MG/1; MG/1
1 TABLET ORAL 2 TIMES DAILY
Qty: 14 TABLET | Refills: 0 | Status: SHIPPED | OUTPATIENT
Start: 2022-12-30 | End: 2023-01-06

## 2022-12-30 NOTE — TELEPHONE ENCOUNTER
[employer] Page:  Patient relates seen in the emergency room and treated for UTI. Review of chart 12/12/2022: Urine culture Klebsiella. Moderate treated with cefuroxime. She states recurrence of symptoms in the last few days: Burning with urination. Frequency of urination. Upon review of urine culture both E. coli and Klebsiella sensitive to Cipro and TMP sulfa. Patient relates no allergies to antibiotics. Impression: UTI  Plan: TMP sulfa emailed to [facility].   Patient also states she was told to see urologist  Dr. aSlas Roles

## 2023-01-19 ENCOUNTER — HOSPITAL ENCOUNTER (EMERGENCY)
Age: 43
Discharge: HOME | End: 2023-01-19
Attending: EMERGENCY MEDICINE
Payer: COMMERCIAL

## 2023-01-19 ENCOUNTER — APPOINTMENT (OUTPATIENT)
Dept: ULTRASOUND IMAGING | Age: 43
End: 2023-01-19
Payer: COMMERCIAL

## 2023-01-19 VITALS
WEIGHT: 145 LBS | DIASTOLIC BLOOD PRESSURE: 94 MMHG | SYSTOLIC BLOOD PRESSURE: 139 MMHG | HEART RATE: 91 BPM | BODY MASS INDEX: 25.69 KG/M2 | OXYGEN SATURATION: 100 % | TEMPERATURE: 98.2 F | HEIGHT: 63 IN | RESPIRATION RATE: 16 BRPM

## 2023-01-19 DIAGNOSIS — O20.0 THREATENED MISCARRIAGE: Primary | ICD-10-CM

## 2023-01-19 DIAGNOSIS — R82.71 BACTERIURIA IN PREGNANCY: ICD-10-CM

## 2023-01-19 DIAGNOSIS — O99.891 BACTERIURIA IN PREGNANCY: ICD-10-CM

## 2023-01-19 LAB
A/G RATIO: 1.2 (ref 1.1–2.2)
ALBUMIN SERPL-MCNC: 3.9 G/DL (ref 3.4–5)
ALP BLD-CCNC: 76 U/L (ref 40–129)
ALT SERPL-CCNC: 16 U/L (ref 10–40)
ANION GAP SERPL CALCULATED.3IONS-SCNC: 10 MMOL/L (ref 3–16)
AST SERPL-CCNC: 23 U/L (ref 15–37)
BASOPHILS ABSOLUTE: 0.1 K/UL (ref 0–0.2)
BASOPHILS RELATIVE PERCENT: 0.9 %
BILIRUB SERPL-MCNC: <0.2 MG/DL (ref 0–1)
BILIRUBIN URINE: NEGATIVE
BLOOD, URINE: ABNORMAL
BUN BLDV-MCNC: 12 MG/DL (ref 7–20)
CALCIUM SERPL-MCNC: 9.4 MG/DL (ref 8.3–10.6)
CHLORIDE BLD-SCNC: 103 MMOL/L (ref 99–110)
CLARITY: ABNORMAL
CO2: 26 MMOL/L (ref 21–32)
COLOR: YELLOW
CREAT SERPL-MCNC: 0.6 MG/DL (ref 0.6–1.1)
EOSINOPHILS ABSOLUTE: 0.2 K/UL (ref 0–0.6)
EOSINOPHILS RELATIVE PERCENT: 2.1 %
EPITHELIAL CELLS, UA: ABNORMAL /HPF (ref 0–5)
GFR SERPL CREATININE-BSD FRML MDRD: >60 ML/MIN/{1.73_M2}
GLUCOSE BLD-MCNC: 92 MG/DL (ref 70–99)
GLUCOSE URINE: NEGATIVE MG/DL
GONADOTROPIN, CHORIONIC (HCG) QUANT: 1387 MIU/ML
HCT VFR BLD CALC: 36.7 % (ref 36–48)
HEMOGLOBIN: 12.8 G/DL (ref 12–16)
INR BLD: 0.97 (ref 0.87–1.14)
KETONES, URINE: NEGATIVE MG/DL
LEUKOCYTE ESTERASE, URINE: ABNORMAL
LYMPHOCYTES ABSOLUTE: 2.4 K/UL (ref 1–5.1)
LYMPHOCYTES RELATIVE PERCENT: 28.9 %
MCH RBC QN AUTO: 30.7 PG (ref 26–34)
MCHC RBC AUTO-ENTMCNC: 34.8 G/DL (ref 31–36)
MCV RBC AUTO: 88.1 FL (ref 80–100)
MICROSCOPIC EXAMINATION: YES
MONOCYTES ABSOLUTE: 0.9 K/UL (ref 0–1.3)
MONOCYTES RELATIVE PERCENT: 10.3 %
NEUTROPHILS ABSOLUTE: 4.9 K/UL (ref 1.7–7.7)
NEUTROPHILS RELATIVE PERCENT: 57.8 %
NITRITE, URINE: NEGATIVE
PDW BLD-RTO: 13 % (ref 12.4–15.4)
PH UA: 7 (ref 5–8)
PLATELET # BLD: 312 K/UL (ref 135–450)
PMV BLD AUTO: 7.8 FL (ref 5–10.5)
POTASSIUM REFLEX MAGNESIUM: 4.2 MMOL/L (ref 3.5–5.1)
PROTEIN UA: ABNORMAL MG/DL
PROTHROMBIN TIME: 12.8 SEC (ref 11.7–14.5)
RBC # BLD: 4.17 M/UL (ref 4–5.2)
RBC UA: ABNORMAL /HPF (ref 0–4)
SODIUM BLD-SCNC: 139 MMOL/L (ref 136–145)
SPECIFIC GRAVITY UA: 1.02 (ref 1–1.03)
TOTAL PROTEIN: 7.1 G/DL (ref 6.4–8.2)
URINE TYPE: ABNORMAL
UROBILINOGEN, URINE: 0.2 E.U./DL
WBC # BLD: 8.4 K/UL (ref 4–11)
WBC UA: ABNORMAL /HPF (ref 0–5)

## 2023-01-19 PROCEDURE — 80053 COMPREHEN METABOLIC PANEL: CPT

## 2023-01-19 PROCEDURE — 99284 EMERGENCY DEPT VISIT MOD MDM: CPT

## 2023-01-19 PROCEDURE — 84702 CHORIONIC GONADOTROPIN TEST: CPT

## 2023-01-19 PROCEDURE — 85610 PROTHROMBIN TIME: CPT

## 2023-01-19 PROCEDURE — 76801 OB US < 14 WKS SINGLE FETUS: CPT

## 2023-01-19 PROCEDURE — 85025 COMPLETE CBC W/AUTO DIFF WBC: CPT

## 2023-01-19 PROCEDURE — 81001 URINALYSIS AUTO W/SCOPE: CPT

## 2023-01-19 RX ORDER — PNV NO.95/FERROUS FUM/FOLIC AC 28MG-0.8MG
1 TABLET ORAL DAILY
Qty: 30 TABLET | Refills: 8 | Status: SHIPPED | OUTPATIENT
Start: 2023-01-19 | End: 2023-02-18

## 2023-01-19 RX ORDER — CEPHALEXIN 500 MG/1
500 CAPSULE ORAL 2 TIMES DAILY
Qty: 6 CAPSULE | Refills: 0 | Status: SHIPPED | OUTPATIENT
Start: 2023-01-19 | End: 2023-01-22

## 2023-01-19 ASSESSMENT — PAIN - FUNCTIONAL ASSESSMENT: PAIN_FUNCTIONAL_ASSESSMENT: 0-10

## 2023-01-19 ASSESSMENT — PAIN DESCRIPTION - ORIENTATION: ORIENTATION: LOWER

## 2023-01-19 ASSESSMENT — PAIN DESCRIPTION - DESCRIPTORS: DESCRIPTORS: PRESSURE

## 2023-01-19 ASSESSMENT — PAIN SCALES - GENERAL: PAINLEVEL_OUTOF10: 5

## 2023-01-19 ASSESSMENT — PAIN DESCRIPTION - LOCATION: LOCATION: BACK

## 2023-01-19 NOTE — ED PROVIDER NOTES
Emergency Department Provider Note  Location: [address] [place] [address]  ED  2023     Patient Identification  Juan F Babb is a 38 y.o. female    Chief Complaint  Vaginal Bleeding (Found out she was pregnant this past , went to the bathroom and was having heavy bleeding. States she has not confirmed pregnancy from doctor. Also having lower back pain with bleeding. )      Mode of Arrival  private car    HPI  (History provided by patient)  This is a 38 y.o. female A2 presented today for vaginal bleeding. Patient cannot remember when was her last menstrual period. She states she has been spotting for almost 2 weeks. She thought maybe her period was starting but when it never came, she took a home pregnancy test on Varun 1/15 and found out she is pregnant. Tonight, the spotting got a lot heavier that she needed to use a sanitary pad. She denies abdominal pain. No urinary symptoms. ROS  ***    I have reviewed the following nursing documentation:  Allergies: No Known Allergies    Past medical history:  has a past medical history of Addiction to drug [facility]), ADHD (attention deficit hyperactivity disorder), Anxiety, and Depression. Past surgical history:  has a past surgical history that includes Tympanostomy tube placement. Home medications:   Prior to Admission medications    Medication Sig Start Date End Date Taking? Authorizing Provider   nicotine (NICODERM CQ) 14 MG/24HR Place 1 patch onto the skin daily 22  Ania Garcia MD   buPROPion (WELLBUTRIN XL) 150 MG extended release tablet Take 1 tablet by mouth every morning 22   Ania Garcia MD       Social history:  reports that she has been smoking cigarettes. She has been smoking an average of .5 packs per day. She has never used smokeless tobacco. She reports that she does not drink alcohol and does not use drugs. Family history:  History reviewed. No pertinent family history.     Exam  ED Triage Vitals [23 0040]   BP Temp Temp Source Heart Rate Resp SpO2 Height Weight   (!) 143/99 98.2 °F (36.8 °C) Oral (!) 101 16 98 % 5' 3\" (1.6 m) 145 lb (65.8 kg)       ***      MDM/ED Course  EKG  The Ekg interpreted by me in the absence of a cardiologist shows. {Exam; heart rhythm:44212} with a rate of ***  Axis is   {Left-right-normal:63208}  QTc is  {normal/acceptable:25246}  Intervals and Durations are unremarkable. No specific ST-T wave changes appreciated. No evidence of acute ischemia. No significant change from prior EKG dated ***        Radiology  No results found.       Labs  Results for orders placed or performed during the hospital encounter of 01/19/23   hCG, quantitative, pregnancy   Result Value Ref Range    hCG Quant 1387.0 <5.0 mIU/mL   Protime-INR   Result Value Ref Range    Protime 12.8 11.7 - 14.5 sec    INR 0.97 0.87 - 1.14   CBC with Auto Differential   Result Value Ref Range    WBC 8.4 4.0 - 11.0 K/uL    RBC 4.17 4.00 - 5.20 M/uL    Hemoglobin 12.8 12.0 - 16.0 g/dL    Hematocrit 36.7 36.0 - 48.0 %    MCV 88.1 80.0 - 100.0 fL    MCH 30.7 26.0 - 34.0 pg    MCHC 34.8 31.0 - 36.0 g/dL    RDW 13.0 12.4 - 15.4 %    Platelets 921 584 - 186 K/uL    MPV 7.8 5.0 - 10.5 fL    Neutrophils % 57.8 %    Lymphocytes % 28.9 %    Monocytes % 10.3 %    Eosinophils % 2.1 %    Basophils % 0.9 %    Neutrophils Absolute 4.9 1.7 - 7.7 K/uL    Lymphocytes Absolute 2.4 1.0 - 5.1 K/uL    Monocytes Absolute 0.9 0.0 - 1.3 K/uL    Eosinophils Absolute 0.2 0.0 - 0.6 K/uL    Basophils Absolute 0.1 0.0 - 0.2 K/uL   CMP w/ Reflex to MG   Result Value Ref Range    Sodium 139 136 - 145 mmol/L    Potassium reflex Magnesium 4.2 3.5 - 5.1 mmol/L    Chloride 103 99 - 110 mmol/L    CO2 26 21 - 32 mmol/L    Anion Gap 10 3 - 16    Glucose 92 70 - 99 mg/dL    BUN 12 7 - 20 mg/dL    Creatinine 0.6 0.6 - 1.1 mg/dL    Est, Glom Filt Rate >60 >60    Calcium 9.4 8.3 - 10.6 mg/dL    Total Protein 7.1 6.4 - 8.2 g/dL    Albumin 3.9 3.4 - 5.0 g/dL Albumin/Globulin Ratio 1.2 1.1 - 2.2    Total Bilirubin <0.2 0.0 - 1.0 mg/dL    Alkaline Phosphatase 76 40 - 129 U/L    ALT 16 10 - 40 U/L    AST 23 15 - 37 U/L         - Patient seen and evaluated in room ***.  38 y.o. female presented for ***  - Exam showed ***  - Patient was placed on telemetry during his/her ED stay and no malignant dysrhythmia observed. - Pertinent old records reviewed; specifically      ***    - Patient was given    ED Medication Orders (From admission, onward)      None            Upon reassessment, Juan F Babb said ***.      - Diagnostic studies reviewed. ***  - Lab: *** did not show clinically significant pathology. ***test*** were also considered but not ordered because ***    Social Determinants of Health: ***    - I discussed the results with {:[id number]}. We agreed to ***  - Incidental findings of *** also discussed. - Return precautions also discussed. {:55714} verbalized understanding of care plan and agreed to follow-up with *** as advised. - Is this patient to be included in the SEP-1 Core Measure due to severe sepsis or septic shock? {Wkd5XivZzEb:20333}    Clinical Impression:  No diagnosis found. Disposition:  {Plan; disposition:44564} in {Condition:23868::\"good\",\"fair\",\"stable\",\"guarded\",\"critical\"} condition. Blood pressure (!) 139/94, pulse 91, temperature 98.2 °F (36.8 °C), temperature source Oral, resp. rate 16, height 5' 3\" (1.6 m), weight 145 lb (65.8 kg), SpO2 100 %, not currently breastfeeding. Patient was given scripts for the following medications. I counseled patient how to take these medications. New Prescriptions    No medications on file     Modified Medications    No medications on file       Disposition referral (if applicable):  No follow-up provider specified. ILyn, am the primary attending of record and contributed the majority of evaluation and treatment of emergent care for this encounter.      Total critical care time is *** minutes, which excludes separately billable procedures and updating family. Time spent is specifically for management of the presenting complaint and symptoms initially, direct bedside care, reevaluation, review of records, and consultation. There was a high probability of clinically significant life-threatening deterioration in the patient's condition, which required my urgent intervention. This chart was generated in part by using Dragon Dictation system and may contain errors related to that system including errors in grammar, punctuation, and spelling, as well as words and phrases that may be inappropriate. If there are any questions or concerns please feel free to contact the dictating provider for clarification.      Cassi Hager MD  [facility] - Patient seen and evaluated in room 21.  38 y.o. female presented for vaginal bleeding during first trimester of pregnancy. Exam showed WDWN F, NAD. No tachycardia or hypotension. Mild but steady bleeding on exam. Beta quant 1387. No prior for comparison. No anemia. No metabolic derangement. UA showed trace LE, 4+ bacteria, 3-5 WBC with 2-5 epi cells. US showed a yolk sac but no fetal pole. No evidence of ectopic pregnancy. Will need 48-hour repeat US. Social Determinants of Health: patient has not established OB care for this pregnancy. We will refer the patient to OB.    - I discussed the results with patient. We agreed to discharge home with 48-hour repeat beta. Pelvic precaution given. - Return precautions also discussed. patient verbalized understanding of care plan and agreed to follow-up with OB as advised. - Is this patient to be included in the SEP-1 Core Measure due to severe sepsis or septic shock? No   Exclusion criteria - the patient is NOT to be included for SEP-1 Core Measure due to: Infection is not suspected    Clinical Impression:  1. Threatened miscarriage    2. Bacteriuria in pregnancy        Disposition:  Discharge to home in good condition. Blood pressure (!) 139/94, pulse 91, temperature 98.2 °F (36.8 °C), temperature source Oral, resp. rate 16, height 5' 3\" (1.6 m), weight 145 lb (65.8 kg), SpO2 100 %, not currently breastfeeding. Patient was given scripts for the following medications. I counseled patient how to take these medications.    Discharge Medication List as of 1/19/2023  3:50 AM        START taking these medications    Details   Prenatal Vit-Fe Fumarate-FA (PRENATAL VITAMIN) 27-0.8 MG TABS Take 1 tablet by mouth daily, Disp-30 tablet, R-8Normal      cephALEXin (KEFLEX) 500 MG capsule Take 1 capsule by mouth 2 times daily for 3 days, Disp-6 capsule, R-0Normal           Disposition referral (if applicable):  [place] Carrillo,   [address] [facility] [address]  [address] [facility] [address] [facility]    Call today  Please call in the morning to schedule a follow-up    Marjan BUCHANAN am the primary attending of record and contributed the majority of evaluation and treatment of emergent care for this encounter. Total critical care time is 0 minutes, which excludes separately billable procedures and updating family. Time spent is specifically for management of the presenting complaint and symptoms initially, direct bedside care, reevaluation, review of records, and consultation. There was a high probability of clinically significant life-threatening deterioration in the patient's condition, which required my urgent intervention. This chart was generated in part by using Dragon Dictation system and may contain errors related to that system including errors in grammar, punctuation, and spelling, as well as words and phrases that may be inappropriate. If there are any questions or concerns please feel free to contact the dictating provider for clarification.      Marjan Mccurdy MD  [facility] Pierce Green MD  01/23/23 1517

## 2023-01-21 ENCOUNTER — HOSPITAL ENCOUNTER (OUTPATIENT)
Age: 43
Discharge: HOME | End: 2023-01-21

## 2023-01-21 DIAGNOSIS — O20.0 THREATENED MISCARRIAGE: ICD-10-CM

## 2023-01-21 PROCEDURE — 36415 COLL VENOUS BLD VENIPUNCTURE: CPT

## 2023-01-21 PROCEDURE — 84702 CHORIONIC GONADOTROPIN TEST: CPT

## 2023-01-22 LAB — GONADOTROPIN, CHORIONIC (HCG) QUANT: 99.2 MIU/ML

## 2023-03-23 ENCOUNTER — TELEPHONE (OUTPATIENT)
Dept: INTERNAL MEDICINE CLINIC | Age: 43
End: 2023-03-23

## 2023-03-23 NOTE — TELEPHONE ENCOUNTER
Reached out to patient to [facility]. Daniela with OBGYN. Pt no showed today and needs to be treated for Hep C for pregnancy.

## 2023-06-23 DIAGNOSIS — F32.0 CURRENT MILD EPISODE OF MAJOR DEPRESSIVE DISORDER, UNSPECIFIED WHETHER RECURRENT (HCC): ICD-10-CM

## 2023-06-24 RX ORDER — BUPROPION HYDROCHLORIDE 150 MG/1
150 TABLET ORAL EVERY MORNING
Qty: 30 TABLET | Refills: 3 | OUTPATIENT
Start: 2023-06-24

## 2023-07-20 ENCOUNTER — HOSPITAL ENCOUNTER (EMERGENCY)
Age: 43
Discharge: HOME | End: 2023-07-21
Payer: COMMERCIAL

## 2023-07-20 ENCOUNTER — APPOINTMENT (OUTPATIENT)
Dept: ULTRASOUND IMAGING | Age: 43
End: 2023-07-20
Payer: COMMERCIAL

## 2023-07-20 ENCOUNTER — APPOINTMENT (OUTPATIENT)
Dept: CT IMAGING | Age: 43
End: 2023-07-20
Payer: COMMERCIAL

## 2023-07-20 DIAGNOSIS — N76.0 BACTERIAL VAGINOSIS: ICD-10-CM

## 2023-07-20 DIAGNOSIS — K42.9 UMBILICAL HERNIA WITHOUT OBSTRUCTION AND WITHOUT GANGRENE: ICD-10-CM

## 2023-07-20 DIAGNOSIS — R10.9 ABDOMINAL PAIN, UNSPECIFIED ABDOMINAL LOCATION: Primary | ICD-10-CM

## 2023-07-20 DIAGNOSIS — R19.00 ABDOMINAL SWELLING: ICD-10-CM

## 2023-07-20 DIAGNOSIS — N94.89 ADNEXAL MASS: ICD-10-CM

## 2023-07-20 DIAGNOSIS — B96.89 BACTERIAL VAGINOSIS: ICD-10-CM

## 2023-07-20 DIAGNOSIS — N94.9 ADNEXAL CYST: ICD-10-CM

## 2023-07-20 LAB
ALBUMIN SERPL-MCNC: 3.8 G/DL (ref 3.4–5)
ALBUMIN/GLOB SERPL: 1 {RATIO} (ref 1.1–2.2)
ALP SERPL-CCNC: 93 U/L (ref 40–129)
ALT SERPL-CCNC: 20 U/L (ref 10–40)
ANION GAP SERPL CALCULATED.3IONS-SCNC: 11 MMOL/L (ref 3–16)
AST SERPL-CCNC: 18 U/L (ref 15–37)
BACTERIA GENITAL QL WET PREP: ABNORMAL
BASOPHILS # BLD: 0.1 K/UL (ref 0–0.2)
BASOPHILS NFR BLD: 1 %
BILIRUB SERPL-MCNC: 0.3 MG/DL (ref 0–1)
BILIRUB UR QL STRIP.AUTO: NEGATIVE
BUN SERPL-MCNC: 9 MG/DL (ref 7–20)
CALCIUM SERPL-MCNC: 9.2 MG/DL (ref 8.3–10.6)
CHLORIDE SERPL-SCNC: 101 MMOL/L (ref 99–110)
CLARITY UR: CLEAR
CLUE CELLS SPEC QL WET PREP: ABNORMAL
CO2 SERPL-SCNC: 26 MMOL/L (ref 21–32)
COLOR UR: YELLOW
CREAT SERPL-MCNC: 0.6 MG/DL (ref 0.6–1.1)
DEPRECATED RDW RBC AUTO: 14.7 % (ref 12.4–15.4)
EOSINOPHIL # BLD: 0.3 K/UL (ref 0–0.6)
EOSINOPHIL NFR BLD: 4.4 %
EPI CELLS #/AREA URNS HPF: NORMAL /HPF (ref 0–5)
EPI CELLS SPEC QL WET PREP: ABNORMAL
GFR SERPLBLD CREATININE-BSD FMLA CKD-EPI: >60 ML/MIN/{1.73_M2}
GLUCOSE SERPL-MCNC: 111 MG/DL (ref 70–99)
GLUCOSE UR STRIP.AUTO-MCNC: NEGATIVE MG/DL
HCG UR QL: NEGATIVE
HCT VFR BLD AUTO: 38.3 % (ref 36–48)
HGB BLD-MCNC: 12.8 G/DL (ref 12–16)
HGB UR QL STRIP.AUTO: NEGATIVE
KETONES UR STRIP.AUTO-MCNC: NEGATIVE MG/DL
LEUKOCYTE ESTERASE UR QL STRIP.AUTO: NEGATIVE
LIPASE SERPL-CCNC: 15 U/L (ref 13–60)
LYMPHOCYTES # BLD: 1.5 K/UL (ref 1–5.1)
LYMPHOCYTES NFR BLD: 21.9 %
MCH RBC QN AUTO: 26.6 PG (ref 26–34)
MCHC RBC AUTO-ENTMCNC: 33.3 G/DL (ref 31–36)
MCV RBC AUTO: 80 FL (ref 80–100)
MONOCYTES # BLD: 0.5 K/UL (ref 0–1.3)
MONOCYTES NFR BLD: 7.6 %
NEUTROPHILS # BLD: 4.6 K/UL (ref 1.7–7.7)
NEUTROPHILS NFR BLD: 65.1 %
NITRITE UR QL STRIP.AUTO: NEGATIVE
PH UR STRIP.AUTO: 8 [PH] (ref 5–8)
PLATELET # BLD AUTO: 299 K/UL (ref 135–450)
PMV BLD AUTO: 7.3 FL (ref 5–10.5)
POTASSIUM SERPL-SCNC: 4.2 MMOL/L (ref 3.5–5.1)
PROT SERPL-MCNC: 7.5 G/DL (ref 6.4–8.2)
PROT UR STRIP.AUTO-MCNC: NEGATIVE MG/DL
RBC # BLD AUTO: 4.79 M/UL (ref 4–5.2)
RBC #/AREA URNS HPF: NORMAL /HPF (ref 0–4)
RBC SPEC QL WET PREP: ABNORMAL
SODIUM SERPL-SCNC: 138 MMOL/L (ref 136–145)
SP GR UR STRIP.AUTO: 1.01 (ref 1–1.03)
SPECIMEN SOURCE FLD: ABNORMAL
T VAGINALIS GENITAL QL WET PREP: ABNORMAL
UA DIPSTICK W REFLEX MICRO PNL UR: NORMAL
URN SPEC COLLECT METH UR: NORMAL
UROBILINOGEN UR STRIP-ACNC: 1 E.U./DL
WBC # BLD AUTO: 7.1 K/UL (ref 4–11)
WBC #/AREA URNS HPF: NORMAL /HPF (ref 0–5)
WBC SPEC QL WET PREP: ABNORMAL
YEAST GENITAL QL WET PREP: ABNORMAL

## 2023-07-20 PROCEDURE — 85025 COMPLETE CBC W/AUTO DIFF WBC: CPT

## 2023-07-20 PROCEDURE — 87491 CHLMYD TRACH DNA AMP PROBE: CPT

## 2023-07-20 PROCEDURE — 81001 URINALYSIS AUTO W/SCOPE: CPT

## 2023-07-20 PROCEDURE — 99285 EMERGENCY DEPT VISIT HI MDM: CPT

## 2023-07-20 PROCEDURE — 36415 COLL VENOUS BLD VENIPUNCTURE: CPT

## 2023-07-20 PROCEDURE — 80053 COMPREHEN METABOLIC PANEL: CPT

## 2023-07-20 PROCEDURE — 93975 VASCULAR STUDY: CPT

## 2023-07-20 PROCEDURE — 87210 SMEAR WET MOUNT SALINE/INK: CPT

## 2023-07-20 PROCEDURE — 74177 CT ABD & PELVIS W/CONTRAST: CPT

## 2023-07-20 PROCEDURE — 96374 THER/PROPH/DIAG INJ IV PUSH: CPT

## 2023-07-20 PROCEDURE — 6360000002 HC RX W HCPCS: Performed by: PHYSICIAN ASSISTANT

## 2023-07-20 PROCEDURE — 83690 ASSAY OF LIPASE: CPT

## 2023-07-20 PROCEDURE — 84703 CHORIONIC GONADOTROPIN ASSAY: CPT

## 2023-07-20 PROCEDURE — 87591 N.GONORRHOEAE DNA AMP PROB: CPT

## 2023-07-20 PROCEDURE — 6360000004 HC RX CONTRAST MEDICATION: Performed by: PHYSICIAN ASSISTANT

## 2023-07-20 PROCEDURE — 76830 TRANSVAGINAL US NON-OB: CPT

## 2023-07-20 RX ORDER — KETOROLAC TROMETHAMINE 30 MG/ML
15 INJECTION, SOLUTION INTRAMUSCULAR; INTRAVENOUS ONCE
Status: COMPLETED | OUTPATIENT
Start: 2023-07-20 | End: 2023-07-20

## 2023-07-20 RX ADMIN — IOPAMIDOL 75 ML: 755 INJECTION, SOLUTION INTRAVENOUS at 20:16

## 2023-07-20 RX ADMIN — KETOROLAC TROMETHAMINE 15 MG: 30 INJECTION, SOLUTION INTRAMUSCULAR; INTRAVENOUS at 19:42

## 2023-07-20 ASSESSMENT — PAIN DESCRIPTION - LOCATION
LOCATION: ABDOMEN
LOCATION: ABDOMEN

## 2023-07-20 ASSESSMENT — PAIN DESCRIPTION - PAIN TYPE: TYPE: ACUTE PAIN

## 2023-07-20 ASSESSMENT — PAIN SCALES - GENERAL: PAINLEVEL_OUTOF10: 8

## 2023-07-20 ASSESSMENT — PAIN DESCRIPTION - DESCRIPTORS: DESCRIPTORS: SHARP

## 2023-07-20 ASSESSMENT — PAIN - FUNCTIONAL ASSESSMENT
PAIN_FUNCTIONAL_ASSESSMENT: ACTIVITIES ARE NOT PREVENTED
PAIN_FUNCTIONAL_ASSESSMENT: 0-10

## 2023-07-20 NOTE — ED PROVIDER NOTES
[address] [address] ED  EMERGENCY DEPARTMENT ENCOUNTER        Pt Name: Frances Pisano  MRN: [id number]  [address] [facility] 1980  Date of evaluation: 7/20/2023  Provider: Fernanda Haile PA-C  PCP: Nikko Motley MD  Note Started: 6:51 PM EDT 7/20/23      This patient was seen and evaluated by Dr Emilia Rayo       Chief Complaint   Patient presents with    Abdominal Pain     Patient has had abdominal pain for several months, had a miscarriage in January. States her stomach is swollen, denies N/V/D       HISTORY OF PRESENT ILLNESS: 1 or more Elements     History From: Patient  Limitations to history : None    Frances Pisano is a 41 y.o. female who presents for evaluation of abdominal pain symptoms for the past 1-1/2 weeks states her stomach is swollen and bloated. She had miscarriage in February. States she had a period last week but was shorter than usual and lasted 3 to 4 days typically has heavier longer periods. No vaginal discharge. No vomiting or diarrhea no chest pain or shortness of breath no cough or cold symptoms or fever or urinary symptoms. Nursing Notes were all reviewed and agreed with or any disagreements were addressed in the HPI. REVIEW OF SYSTEMS :      Review of Systems    Positives and Pertinent negatives as per HPI. SURGICAL HISTORY     Past Surgical History:   Procedure Laterality Date    TYMPANOSTOMY TUBE PLACEMENT         CURRENTMEDICATIONS       Previous Medications    BUPROPION (WELLBUTRIN XL) 150 MG EXTENDED RELEASE TABLET    Take 1 tablet by mouth every morning    IBUPROFEN (ADVIL;MOTRIN) 800 MG TABLET    Take 1 tablet by mouth every 8 hours as needed for Pain or Fever    NAPROXEN (NAPROSYN) 500 MG TABLET    Take 1 tablet by mouth 2 times daily (with meals) for 30 doses.     NICOTINE (NICODERM CQ) 14 MG/24HR    Place 1 patch onto the skin daily    PRENATAL VIT-FE FUMARATE-FA (PRENATAL VITAMIN) 27-0.8 MG TABS    Take 1 tablet by mouth daily Platelets 328 556 - 099 K/uL    MPV 7.3 5.0 - 10.5 fL    Neutrophils % 65.1 %    Lymphocytes % 21.9 %    Monocytes % 7.6 %    Eosinophils % 4.4 %    Basophils % 1.0 %    Neutrophils Absolute 4.6 1.7 - 7.7 K/uL    Lymphocytes Absolute 1.5 1.0 - 5.1 K/uL    Monocytes Absolute 0.5 0.0 - 1.3 K/uL    Eosinophils Absolute 0.3 0.0 - 0.6 K/uL    Basophils Absolute 0.1 0.0 - 0.2 K/uL   Comprehensive Metabolic Panel w/ Reflex to MG   Result Value Ref Range    Sodium 138 136 - 145 mmol/L    Potassium reflex Magnesium 4.2 3.5 - 5.1 mmol/L    Chloride 101 99 - 110 mmol/L    CO2 26 21 - 32 mmol/L    Anion Gap 11 3 - 16    Glucose 111 (H) 70 - 99 mg/dL    BUN 9 7 - 20 mg/dL    Creatinine 0.6 0.6 - 1.1 mg/dL    Est, Glom Filt Rate >60 >60    Calcium 9.2 8.3 - 10.6 mg/dL    Total Protein 7.5 6.4 - 8.2 g/dL    Albumin 3.8 3.4 - 5.0 g/dL    Albumin/Globulin Ratio 1.0 (L) 1.1 - 2.2    Total Bilirubin 0.3 0.0 - 1.0 mg/dL    Alkaline Phosphatase 93 40 - 129 U/L    ALT 20 10 - 40 U/L    AST 18 15 - 37 U/L   Lipase   Result Value Ref Range    Lipase 15.0 13.0 - 60.0 U/L       When ordered only abnormal lab results are displayed. All other labs were within normal range or not returned as of this dictation. EKG: When ordered, EKG's are interpreted by the Emergency Department Physician in the absence of a cardiologist.  Please see their note for interpretation of EKG. RADIOLOGY:   Non-plain film images such as CT, Ultrasound and MRI are read by the radiologist. Plain radiographic images are visualized and preliminarily interpreted by the ED Provider with the below findings:        Interpretation per the Radiologist below, if available at the time of this note:    CT ABDOMEN PELVIS W IV CONTRAST Additional Contrast? None   Final Result   5.9 x 4.5 cm left adnexal cyst with hyperdense fluid within the pelvis which   may reflect blood products. Recommend further evaluation with pelvic   ultrasound.       Small fat containing umbilical

## 2023-07-21 VITALS
RESPIRATION RATE: 20 BRPM | HEART RATE: 88 BPM | DIASTOLIC BLOOD PRESSURE: 70 MMHG | TEMPERATURE: 98.1 F | SYSTOLIC BLOOD PRESSURE: 116 MMHG | BODY MASS INDEX: 26.05 KG/M2 | OXYGEN SATURATION: 100 % | WEIGHT: 147 LBS | HEIGHT: 63 IN

## 2023-07-21 LAB
C TRACH DNA UR QL NAA+PROBE: NEGATIVE
N GONORRHOEA DNA UR QL NAA+PROBE: NEGATIVE

## 2023-07-21 PROCEDURE — 6370000000 HC RX 637 (ALT 250 FOR IP): Performed by: PHYSICIAN ASSISTANT

## 2023-07-21 RX ORDER — FLUCONAZOLE 150 MG/1
150 TABLET ORAL ONCE
Qty: 1 TABLET | Refills: 0 | Status: SHIPPED | OUTPATIENT
Start: 2023-07-21 | End: 2023-07-21

## 2023-07-21 RX ORDER — METRONIDAZOLE 250 MG/1
500 TABLET ORAL ONCE
Status: COMPLETED | OUTPATIENT
Start: 2023-07-21 | End: 2023-07-21

## 2023-07-21 RX ORDER — METRONIDAZOLE 500 MG/1
500 TABLET ORAL 2 TIMES DAILY
Qty: 14 TABLET | Refills: 0 | Status: SHIPPED | OUTPATIENT
Start: 2023-07-21 | End: 2023-07-28

## 2023-07-21 RX ORDER — KETOROLAC TROMETHAMINE 10 MG/1
10 TABLET, FILM COATED ORAL EVERY 6 HOURS PRN
Qty: 20 TABLET | Refills: 0 | Status: SHIPPED | OUTPATIENT
Start: 2023-07-21 | End: 2024-07-20

## 2023-07-21 RX ORDER — FLUCONAZOLE 100 MG/1
150 TABLET ORAL ONCE
Status: COMPLETED | OUTPATIENT
Start: 2023-07-21 | End: 2023-07-21

## 2023-07-21 RX ADMIN — FLUCONAZOLE 150 MG: 100 TABLET ORAL at 02:23

## 2023-07-21 RX ADMIN — METRONIDAZOLE 500 MG: 250 TABLET ORAL at 02:23

## 2023-07-21 ASSESSMENT — PAIN SCALES - GENERAL: PAINLEVEL_OUTOF10: 3

## 2023-07-21 ASSESSMENT — PAIN - FUNCTIONAL ASSESSMENT
PAIN_FUNCTIONAL_ASSESSMENT: 0-10
PAIN_FUNCTIONAL_ASSESSMENT: NONE - DENIES PAIN

## 2023-07-21 NOTE — ED NOTES
Pt came back and was lying on the same bed she was on. Informed Kwadwo Shahid, [place].       Angela Bey RN  07/21/23 8646

## 2023-07-21 NOTE — ED PROVIDER NOTES
Urine Negative Negative    pH, UA 8.0 5.0 - 8.0    Protein, UA Negative Negative mg/dL    Urobilinogen, Urine 1.0 <2.0 E.U./dL    Nitrite, Urine Negative Negative    Leukocyte Esterase, Urine Negative Negative    Microscopic Examination Not Indicated     Urine Type NotGiven     WBC, UA 0-2 0 - 5 /HPF    RBC, UA 0-2 0 - 4 /HPF    Epithelial Cells, UA 2-5 0 - 5 /HPF   CBC with Auto Differential   Result Value Ref Range    WBC 7.1 4.0 - 11.0 K/uL    RBC 4.79 4.00 - 5.20 M/uL    Hemoglobin 12.8 12.0 - 16.0 g/dL    Hematocrit 38.3 36.0 - 48.0 %    MCV 80.0 80.0 - 100.0 fL    MCH 26.6 26.0 - 34.0 pg    MCHC 33.3 31.0 - 36.0 g/dL    RDW 14.7 12.4 - 15.4 %    Platelets 821 319 - 084 K/uL    MPV 7.3 5.0 - 10.5 fL    Neutrophils % 65.1 %    Lymphocytes % 21.9 %    Monocytes % 7.6 %    Eosinophils % 4.4 %    Basophils % 1.0 %    Neutrophils Absolute 4.6 1.7 - 7.7 K/uL    Lymphocytes Absolute 1.5 1.0 - 5.1 K/uL    Monocytes Absolute 0.5 0.0 - 1.3 K/uL    Eosinophils Absolute 0.3 0.0 - 0.6 K/uL    Basophils Absolute 0.1 0.0 - 0.2 K/uL   Comprehensive Metabolic Panel w/ Reflex to MG   Result Value Ref Range    Sodium 138 136 - 145 mmol/L    Potassium reflex Magnesium 4.2 3.5 - 5.1 mmol/L    Chloride 101 99 - 110 mmol/L    CO2 26 21 - 32 mmol/L    Anion Gap 11 3 - 16    Glucose 111 (H) 70 - 99 mg/dL    BUN 9 7 - 20 mg/dL    Creatinine 0.6 0.6 - 1.1 mg/dL    Est, Glom Filt Rate >60 >60    Calcium 9.2 8.3 - 10.6 mg/dL    Total Protein 7.5 6.4 - 8.2 g/dL    Albumin 3.8 3.4 - 5.0 g/dL    Albumin/Globulin Ratio 1.0 (L) 1.1 - 2.2    Total Bilirubin 0.3 0.0 - 1.0 mg/dL    Alkaline Phosphatase 93 40 - 129 U/L    ALT 20 10 - 40 U/L    AST 18 15 - 37 U/L   Lipase   Result Value Ref Range    Lipase 15.0 13.0 - 60.0 U/L       RADIOLOGY  X-RAYS:  I have reviewed radiologic plain film image(s). ALL OTHER NON-PLAIN FILM IMAGES SUCH AS CT, ULTRASOUND AND MRI HAVE BEEN READ BY THE RADIOLOGIST.   US DUP ABD PEL RETRO SCROT COMPLETE   Preliminary their surrogate and we are all in agreement with discharge home. There was an opportunity for questions and all questions were answered to the best of my ability and to the satisfaction of the patient and/or patient's family. Patient agreed to follow up as recommend for further evaluation/treatment. The patient was given strict return precautions as we discussed symptoms that would necessitate return to the ED. Patient will return to ED for new/worsening symptoms. The patient verbalized their understanding and agreement with the above plan. Please refer to AVS for further details regarding discharge instructions. Pt is in stable condition upon Discharge to home. All diagnostic, treatment, and disposition decisions were made by myself in conjunction with the JOCELYN/resident. For further details of the patient's emergency department visit, please see JOCELYN/resident documentation. Initial history and physical exam information was obtained by the JOCELYN/resident, also dictated a record of this visit. I independently examined and evaluated this patient and participated in the diagnostic, treatment and disposition decisions. I personally saw the patient and performed a substantive portion of the visit including all aspects of the medical decision making. I personally saw the patient and independently provided  non-concurrent critical care out of the total shared critical care time provided. Please note, critical care time was at least 0 minutes, obtaining history, conducting a physical exam, performing and monitoring interventions, ordering, collecting and interpreting tests, and establishing medical decision-making and discussion with the patient and/or family, specifically for management of the presenting complaint and symptoms initially, direct bedside care, reevaluation, review of records, and consultation.   There was a high probability of clinically significant life-threatening deterioration in the patient's

## 2023-11-28 ENCOUNTER — APPOINTMENT (OUTPATIENT)
Dept: CT IMAGING | Age: 43
End: 2023-11-28
Payer: COMMERCIAL

## 2023-11-28 ENCOUNTER — HOSPITAL ENCOUNTER (EMERGENCY)
Age: 43
Discharge: HOME | End: 2023-11-28
Attending: EMERGENCY MEDICINE
Payer: COMMERCIAL

## 2023-11-28 VITALS
BODY MASS INDEX: 26.05 KG/M2 | HEIGHT: 63 IN | TEMPERATURE: 99.1 F | WEIGHT: 147 LBS | RESPIRATION RATE: 16 BRPM | DIASTOLIC BLOOD PRESSURE: 68 MMHG | HEART RATE: 98 BPM | OXYGEN SATURATION: 99 % | SYSTOLIC BLOOD PRESSURE: 125 MMHG

## 2023-11-28 DIAGNOSIS — K56.41 FECAL IMPACTION IN RECTUM (HCC): ICD-10-CM

## 2023-11-28 DIAGNOSIS — M54.50 ACUTE RIGHT-SIDED LOW BACK PAIN WITHOUT SCIATICA: ICD-10-CM

## 2023-11-28 DIAGNOSIS — K42.9 UMBILICAL HERNIA WITHOUT OBSTRUCTION AND WITHOUT GANGRENE: ICD-10-CM

## 2023-11-28 DIAGNOSIS — N39.0 URINARY TRACT INFECTION WITHOUT HEMATURIA, SITE UNSPECIFIED: Primary | ICD-10-CM

## 2023-11-28 DIAGNOSIS — N94.89 ADNEXAL MASS: ICD-10-CM

## 2023-11-28 LAB
ALBUMIN SERPL-MCNC: 3.9 G/DL (ref 3.4–5)
ALBUMIN/GLOB SERPL: 1.1 {RATIO} (ref 1.1–2.2)
ALP SERPL-CCNC: 101 U/L (ref 40–129)
ALT SERPL-CCNC: 19 U/L (ref 10–40)
ANION GAP SERPL CALCULATED.3IONS-SCNC: 10 MMOL/L (ref 3–16)
AST SERPL-CCNC: 17 U/L (ref 15–37)
BACTERIA URNS QL MICRO: ABNORMAL /HPF
BASOPHILS # BLD: 0.1 K/UL (ref 0–0.2)
BASOPHILS NFR BLD: 0.6 %
BILIRUB SERPL-MCNC: 0.7 MG/DL (ref 0–1)
BILIRUB UR QL STRIP.AUTO: NEGATIVE
BUN SERPL-MCNC: 12 MG/DL (ref 7–20)
CALCIUM SERPL-MCNC: 8.8 MG/DL (ref 8.3–10.6)
CHLORIDE SERPL-SCNC: 95 MMOL/L (ref 99–110)
CLARITY UR: ABNORMAL
CO2 SERPL-SCNC: 25 MMOL/L (ref 21–32)
COLOR UR: ABNORMAL
CREAT SERPL-MCNC: 0.6 MG/DL (ref 0.6–1.1)
DEPRECATED RDW RBC AUTO: 14.1 % (ref 12.4–15.4)
EOSINOPHIL # BLD: 0.2 K/UL (ref 0–0.6)
EOSINOPHIL NFR BLD: 1.8 %
GFR SERPLBLD CREATININE-BSD FMLA CKD-EPI: >60 ML/MIN/{1.73_M2}
GLUCOSE SERPL-MCNC: 137 MG/DL (ref 70–99)
GLUCOSE UR STRIP.AUTO-MCNC: NEGATIVE MG/DL
HCG SERPL QL: NEGATIVE
HCT VFR BLD AUTO: 40.2 % (ref 36–48)
HGB BLD-MCNC: 13.4 G/DL (ref 12–16)
HGB UR QL STRIP.AUTO: ABNORMAL
KETONES UR STRIP.AUTO-MCNC: NEGATIVE MG/DL
LEUKOCYTE ESTERASE UR QL STRIP.AUTO: ABNORMAL
LYMPHOCYTES # BLD: 1.4 K/UL (ref 1–5.1)
LYMPHOCYTES NFR BLD: 10.6 %
MCH RBC QN AUTO: 28.1 PG (ref 26–34)
MCHC RBC AUTO-ENTMCNC: 33.2 G/DL (ref 31–36)
MCV RBC AUTO: 84.6 FL (ref 80–100)
MONOCYTES # BLD: 1.1 K/UL (ref 0–1.3)
MONOCYTES NFR BLD: 8.9 %
NEUTROPHILS # BLD: 9.9 K/UL (ref 1.7–7.7)
NEUTROPHILS NFR BLD: 78.1 %
NITRITE UR QL STRIP.AUTO: POSITIVE
PH UR STRIP.AUTO: 6 [PH] (ref 5–8)
PLATELET # BLD AUTO: 284 K/UL (ref 135–450)
PMV BLD AUTO: 7.5 FL (ref 5–10.5)
POTASSIUM SERPL-SCNC: 3.5 MMOL/L (ref 3.5–5.1)
PROT SERPL-MCNC: 7.6 G/DL (ref 6.4–8.2)
PROT UR STRIP.AUTO-MCNC: NEGATIVE MG/DL
RBC # BLD AUTO: 4.76 M/UL (ref 4–5.2)
RBC #/AREA URNS HPF: ABNORMAL /HPF (ref 0–4)
SODIUM SERPL-SCNC: 130 MMOL/L (ref 136–145)
SP GR UR STRIP.AUTO: 1.02 (ref 1–1.03)
UA DIPSTICK W REFLEX MICRO PNL UR: YES
URN SPEC COLLECT METH UR: ABNORMAL
UROBILINOGEN UR STRIP-ACNC: 0.2 E.U./DL
WBC # BLD AUTO: 12.7 K/UL (ref 4–11)
WBC #/AREA URNS HPF: >100 /HPF (ref 0–5)

## 2023-11-28 PROCEDURE — 81001 URINALYSIS AUTO W/SCOPE: CPT

## 2023-11-28 PROCEDURE — 84703 CHORIONIC GONADOTROPIN ASSAY: CPT

## 2023-11-28 PROCEDURE — 99284 EMERGENCY DEPT VISIT MOD MDM: CPT

## 2023-11-28 PROCEDURE — 6370000000 HC RX 637 (ALT 250 FOR IP): Performed by: EMERGENCY MEDICINE

## 2023-11-28 PROCEDURE — 85025 COMPLETE CBC W/AUTO DIFF WBC: CPT

## 2023-11-28 PROCEDURE — 80053 COMPREHEN METABOLIC PANEL: CPT

## 2023-11-28 PROCEDURE — 74176 CT ABD & PELVIS W/O CONTRAST: CPT

## 2023-11-28 RX ORDER — DOCUSATE SODIUM 100 MG/1
100 CAPSULE, LIQUID FILLED ORAL 2 TIMES DAILY
Qty: 60 CAPSULE | Refills: 0 | Status: SHIPPED | OUTPATIENT
Start: 2023-11-28 | End: 2023-12-28

## 2023-11-28 RX ORDER — IBUPROFEN 600 MG/1
600 TABLET ORAL ONCE
Status: COMPLETED | OUTPATIENT
Start: 2023-11-28 | End: 2023-11-28

## 2023-11-28 RX ORDER — CEPHALEXIN 250 MG/1
500 CAPSULE ORAL ONCE
Status: DISCONTINUED | OUTPATIENT
Start: 2023-11-28 | End: 2023-11-28 | Stop reason: HOSPADM

## 2023-11-28 RX ORDER — DOCUSATE SODIUM 100 MG/1
100 CAPSULE, LIQUID FILLED ORAL ONCE
Status: DISCONTINUED | OUTPATIENT
Start: 2023-11-28 | End: 2023-11-28

## 2023-11-28 RX ORDER — IBUPROFEN 600 MG/1
600 TABLET ORAL EVERY 6 HOURS PRN
Qty: 40 TABLET | Refills: 0 | Status: SHIPPED | OUTPATIENT
Start: 2023-11-28

## 2023-11-28 RX ORDER — CEPHALEXIN 250 MG/1
500 CAPSULE ORAL ONCE
Status: COMPLETED | OUTPATIENT
Start: 2023-11-28 | End: 2023-11-28

## 2023-11-28 RX ADMIN — CEPHALEXIN 500 MG: 250 CAPSULE ORAL at 20:02

## 2023-11-28 RX ADMIN — IBUPROFEN 600 MG: 600 TABLET, FILM COATED ORAL at 20:02

## 2023-11-29 NOTE — ED PROVIDER NOTES
[address] [place] [address]  ED     EMERGENCY DEPARTMENT ENCOUNTER            Pt Name: Billie Stovall   MRN: [id number]   [address] [facility] 1980   Date of evaluation: 11/28/2023   Provider: Nighat Ingram DO   PCP: Chris Quinteros MD   Note Started: 7:40 PM EST 11/28/23          CHIEF COMPLAINT     Chief Complaint   Patient presents with    Flank Pain     Pt to ED for flank pain, pt states she has a known UTI and was seen at [address] [place] [address]. but didn't  her abx yet. Pt states she went home to sleep instead of picking up her abx. HISTORY OF PRESENT ILLNESS:   History from : Patient   Limitations to history : None     Billie Stovall is a 41 y.o. female who presents to the emergency department complaining of right low back pain/flank pain. Patient states that she has noted some urinary symptoms over the last several days and was seen and evaluated 2 days ago at outside hospital and diagnosed with UTI. Patient was placed on Keflex but states that she has been too tired after work to  her medications. Patient has noted moderate right lower back/flank pain starting today. She denies fevers, chills, or sweats. No additional complaints. Nursing Notes were all reviewed and agreed with, or any disagreements were addressed in the HPI. REVIEW OF SYSTEMS :    Positives and Pertinent negatives as per HPI. MEDICAL HISTORY   has a past medical history of Addiction to drug [facility]), ADHD (attention deficit hyperactivity disorder), Anxiety, and Depression.     Past Surgical History:   Procedure Laterality Date    TYMPANOSTOMY TUBE PLACEMENT        CURRENTMEDICATIONS       Previous Medications    BUPROPION (WELLBUTRIN XL) 150 MG EXTENDED RELEASE TABLET    Take 1 tablet by mouth every morning    NICOTINE (NICODERM CQ) 14 MG/24HR    Place 1 patch onto the skin daily    PRENATAL VIT-FE FUMARATE-FA (PRENATAL VITAMIN) 27-0.8 MG TABS    Take 1 tablet by mouth daily      SCREENINGS

## 2023-11-29 NOTE — ED NOTES
Primary RN was administering keflex antibiotic to pt and pt dropped medication on the floor. This writer placed a duplicate order to remove the medication from the omnicell due to override being denied. Pt received cephALEXin (KEFLEX) capsule 500 mg orally. The duplicate order was marked not given (see MAR).       Margarita Greene RN  11/28/23 2011

## 2023-12-11 ENCOUNTER — TELEPHONE (OUTPATIENT)
Dept: CASE MANAGEMENT | Age: 43
End: 2023-12-11

## 2023-12-11 NOTE — TELEPHONE ENCOUNTER
Imaging report CT Abd 11/28/23 with f/u imaging recommendations     IMPRESSION:  No hydronephrosis or renal stones and no CT evidence of urinary obstruction. Contracted gallbladder which is unchanged with no gallstones or  pericholecystic fluid. Suggest a follow-up ultrasound, if indicated. Mild chronic liver changes and mild splenomegaly which is unchanged. Mild constipation and a mild fecal impaction in the rectum which is more  prominent with no obstruction. 6 cm cystic mass left adnexal region which probably represents an ovarian  cyst and is unchanged. Recommend ultrasound follow-up. Small umbilical hernia which is slightly larger in size with mild stranding  in the hernia which could be due to early inflammatory changes with no bowel  loops in the area. Recommend clinical follow-up the hernia. Mild bibasilar atelectasis vs early infiltrates along the lung bases  posteriorly which is more prominent. Clifford Jose  Lung Navigation LEWIS Pinedo [email or web address]

## 2024-06-24 ENCOUNTER — TELEPHONE (OUTPATIENT)
Dept: INTERNAL MEDICINE CLINIC | Age: 44
End: 2024-06-24

## 2024-06-24 NOTE — TELEPHONE ENCOUNTER
----- Message from Gela Booker sent at 6/24/2024  9:06 AM EDT -----  Regarding: [facility] Appointment Request  [facility] Appointment Request    Patient needs appointment for [facility] Appointment Type: Annual Visit.    Reason for Appointment Request: No appointments available during search  Pt called I because wanted to book for awv and pap smear but her dr. Dont have any available schedule   --------------------------------------------------------------------------------------------------------------------------    Relationship to Patient: Self     Call Back Information: OK to leave message on voicemail  Preferred Call Back Number: Phone [phone]